# Patient Record
Sex: MALE | Race: WHITE | NOT HISPANIC OR LATINO | URBAN - METROPOLITAN AREA
[De-identification: names, ages, dates, MRNs, and addresses within clinical notes are randomized per-mention and may not be internally consistent; named-entity substitution may affect disease eponyms.]

---

## 2020-09-16 ENCOUNTER — OFFICE VISIT (OUTPATIENT)
Dept: URBAN - METROPOLITAN AREA CLINIC 121 | Facility: CLINIC | Age: 80
End: 2020-09-16

## 2020-11-12 ENCOUNTER — OFFICE VISIT (OUTPATIENT)
Dept: URBAN - METROPOLITAN AREA CLINIC 63 | Facility: CLINIC | Age: 80
End: 2020-11-12

## 2020-11-17 LAB
ABSOLUTE BASOPHILS: (no result)
ABSOLUTE EOSINOPHILS: (no result)
ABSOLUTE LYMPHOCYTES: (no result)
ABSOLUTE MONOCYTES: (no result)
ABSOLUTE NEUTROPHILS: (no result)
ALBUMIN/GLOBULIN RATIO: (no result)
ALBUMIN: (no result)
ALKALINE PHOSPHATASE: (no result)
ALT: (no result)
AST: (no result)
BASOPHILS: (no result)
BILIRUBIN, TOTAL: (no result)
BUN/CREATININE RATIO: (no result)
CALCIUM: (no result)
CARBON DIOXIDE: (no result)
CHLORIDE: (no result)
CREATININE: (no result)
EGFR AFRICAN AMERICA: (no result)
EGFR NON-AFR. AMERICAN: (no result)
EOSINOPHILS: (no result)
GLOBULIN: (no result)
GLUCOSE: (no result)
HEMATOCRIT: (no result)
HEMOGLOBIN: (no result)
INR: 1
LYMPHOCYTES: (no result)
MCH: (no result)
MCHC: (no result)
MCV: (no result)
MONOCYTES: (no result)
MPV: (no result)
NEUTROPHILS: (no result)
NOTE: (no result)
PLATELET COUNT: (no result)
POTASSIUM: (no result)
PROTEIN, TOTAL: (no result)
PT: (no result)
RDW: (no result)
RED BLOOD CELL COUNT: (no result)
SODIUM: (no result)
UREA NITROGEN (BUN): (no result)
WHITE BLOOD CELL COUNT: (no result)

## 2021-02-12 ENCOUNTER — OFFICE VISIT (OUTPATIENT)
Dept: URBAN - METROPOLITAN AREA CLINIC 63 | Facility: CLINIC | Age: 81
End: 2021-02-12

## 2021-04-15 ENCOUNTER — OFFICE VISIT (OUTPATIENT)
Dept: URBAN - METROPOLITAN AREA CLINIC 63 | Facility: CLINIC | Age: 81
End: 2021-04-15

## 2022-07-09 ENCOUNTER — TELEPHONE ENCOUNTER (OUTPATIENT)
Dept: URBAN - METROPOLITAN AREA CLINIC 121 | Facility: CLINIC | Age: 82
End: 2022-07-09

## 2022-07-09 RX ORDER — OMEPRAZOLE 20 MG/1
CAPSULE, DELAYED RELEASE ORAL ONCE A DAY
Refills: 0 | OUTPATIENT
Start: 2020-11-12 | End: 2020-11-12

## 2022-07-09 RX ORDER — FAMOTIDINE 40 MG/1
ONCE A DAY TABLET, FILM COATED ORAL ONCE A DAY
Refills: 1 | OUTPATIENT
Start: 2020-05-14 | End: 2020-05-18

## 2022-07-09 RX ORDER — OMEPRAZOLE 20 MG/1
CAPSULE, DELAYED RELEASE ORAL ONCE A DAY
Refills: 0 | OUTPATIENT
Start: 2020-01-21 | End: 2020-03-05

## 2022-07-09 RX ORDER — ATORVASTATIN CALCIUM 20 MG/1
TABLET, FILM COATED ORAL ONCE A DAY
Refills: 0 | OUTPATIENT
Start: 2020-03-05 | End: 2020-11-12

## 2022-07-09 RX ORDER — RIFAXIMIN 550 MG/1
TABLET ORAL THREE TIMES A DAY
Refills: 0 | OUTPATIENT
Start: 2020-01-21 | End: 2020-03-05

## 2022-07-09 RX ORDER — RIFAXIMIN 550 MG/1
TABLET ORAL THREE TIMES A DAY
Refills: 0 | OUTPATIENT
Start: 2020-11-12 | End: 2020-11-12

## 2022-07-09 RX ORDER — ATORVASTATIN CALCIUM 20 MG/1
TABLET, FILM COATED ORAL ONCE A DAY
Refills: 0 | OUTPATIENT
Start: 2021-02-12 | End: 2021-04-15

## 2022-07-09 RX ORDER — HYDROCHLOROTHIAZIDE 12.5 MG/1
TABLET ORAL ONCE A DAY
Refills: 0 | OUTPATIENT
Start: 2020-03-05 | End: 2020-11-12

## 2022-07-09 RX ORDER — METFORMIN HYDROCHLORIDE 500 MG/1
TABLET, EXTENDED RELEASE ORAL ONCE A DAY
Refills: 0 | OUTPATIENT
Start: 2020-03-05 | End: 2020-11-12

## 2022-07-09 RX ORDER — LOSARTAN POTASSIUM 25 MG/1
TABLET, FILM COATED ORAL ONCE A DAY
Refills: 0 | OUTPATIENT
Start: 2020-11-12 | End: 2021-02-12

## 2022-07-09 RX ORDER — AMITRIPTYLINE HYDROCHLORIDE 10 MG/1
TABLET, FILM COATED ORAL ONCE A DAY
Refills: 0 | OUTPATIENT
Start: 2020-01-21 | End: 2020-03-05

## 2022-07-09 RX ORDER — AMITRIPTYLINE HYDROCHLORIDE 10 MG/1
TABLET, FILM COATED ORAL ONCE A DAY
Refills: 0 | OUTPATIENT
Start: 2020-03-05 | End: 2020-11-12

## 2022-07-09 RX ORDER — ASPIRIN 81 MG/1
TABLET ORAL ONCE A DAY
Refills: 0 | OUTPATIENT
Start: 2021-02-12 | End: 2021-04-15

## 2022-07-09 RX ORDER — FAMOTIDINE 40 MG/1
TAKE 1 TABLET ONCE DAILY TABLET, FILM COATED ORAL
Refills: 1 | OUTPATIENT
Start: 2022-05-04 | End: 2022-05-12

## 2022-07-09 RX ORDER — METFORMIN HYDROCHLORIDE 500 MG/1
TABLET, EXTENDED RELEASE ORAL ONCE A DAY
Refills: 0 | OUTPATIENT
Start: 2020-11-12 | End: 2021-02-12

## 2022-07-09 RX ORDER — LOSARTAN POTASSIUM 25 MG/1
TABLET, FILM COATED ORAL ONCE A DAY
Refills: 0 | OUTPATIENT
Start: 2021-02-12 | End: 2021-04-15

## 2022-07-09 RX ORDER — HYDROCHLOROTHIAZIDE 12.5 MG/1
TABLET ORAL ONCE A DAY
Refills: 0 | OUTPATIENT
Start: 2020-11-12 | End: 2020-11-12

## 2022-07-09 RX ORDER — METFORMIN HYDROCHLORIDE 500 MG/1
TABLET, EXTENDED RELEASE ORAL ONCE A DAY
Refills: 0 | OUTPATIENT
Start: 2020-01-21 | End: 2020-03-05

## 2022-07-09 RX ORDER — AMOXICILLIN 500 MG/1
CAPSULE ORAL
Refills: 0 | OUTPATIENT
Start: 2020-11-12 | End: 2020-11-12

## 2022-07-09 RX ORDER — LOSARTAN POTASSIUM 25 MG/1
TABLET, FILM COATED ORAL ONCE A DAY
Refills: 0 | OUTPATIENT
Start: 2020-01-21 | End: 2020-03-05

## 2022-07-09 RX ORDER — ASPIRIN 81 MG/1
TABLET ORAL ONCE A DAY
Refills: 0 | OUTPATIENT
Start: 2020-01-21 | End: 2020-03-05

## 2022-07-09 RX ORDER — ASPIRIN 81 MG/1
TABLET ORAL ONCE A DAY
Refills: 0 | OUTPATIENT
Start: 2020-11-12 | End: 2021-02-12

## 2022-07-09 RX ORDER — AMOXICILLIN 500 MG/1
CAPSULE ORAL
Refills: 0 | OUTPATIENT
Start: 2020-03-05 | End: 2020-11-12

## 2022-07-09 RX ORDER — ASPIRIN 81 MG/1
TABLET ORAL ONCE A DAY
Refills: 0 | OUTPATIENT
Start: 2020-03-05 | End: 2020-11-12

## 2022-07-09 RX ORDER — AMOXICILLIN 500 MG/1
CAPSULE ORAL
Refills: 0 | OUTPATIENT
Start: 2020-01-21 | End: 2020-03-05

## 2022-07-09 RX ORDER — FAMOTIDINE 40 MG/1
TAKE 1 TABLET ONCE DAILY TABLET, FILM COATED ORAL
Refills: 1 | OUTPATIENT
Start: 2021-03-19 | End: 2021-10-04

## 2022-07-09 RX ORDER — FAMOTIDINE 40 MG/1
TAKE 1 TABLET ONCE DAILY TABLET, FILM COATED ORAL ONCE A DAY
Refills: 1 | OUTPATIENT
Start: 2021-10-04 | End: 2022-05-04

## 2022-07-09 RX ORDER — ATORVASTATIN CALCIUM 20 MG/1
TABLET, FILM COATED ORAL ONCE A DAY
Refills: 0 | OUTPATIENT
Start: 2020-01-21 | End: 2020-03-05

## 2022-07-09 RX ORDER — HYDROCHLOROTHIAZIDE 12.5 MG/1
TABLET ORAL ONCE A DAY
Refills: 0 | OUTPATIENT
Start: 2020-01-21 | End: 2020-03-05

## 2022-07-09 RX ORDER — ATORVASTATIN CALCIUM 20 MG/1
TABLET, FILM COATED ORAL ONCE A DAY
Refills: 0 | OUTPATIENT
Start: 2020-11-12 | End: 2021-02-12

## 2022-07-09 RX ORDER — LOSARTAN POTASSIUM 25 MG/1
TABLET, FILM COATED ORAL ONCE A DAY
Refills: 0 | OUTPATIENT
Start: 2020-03-05 | End: 2020-11-12

## 2022-07-09 RX ORDER — AMITRIPTYLINE HYDROCHLORIDE 10 MG/1
TABLET, FILM COATED ORAL ONCE A DAY
Refills: 0 | OUTPATIENT
Start: 2020-11-12 | End: 2020-11-12

## 2022-07-09 RX ORDER — FAMOTIDINE 40 MG/1
ONCE A DAY TABLET, FILM COATED ORAL ONCE A DAY
Refills: 1 | OUTPATIENT
Start: 2020-11-12 | End: 2021-03-19

## 2022-07-09 RX ORDER — METFORMIN HYDROCHLORIDE 500 MG/1
TABLET, EXTENDED RELEASE ORAL ONCE A DAY
Refills: 0 | OUTPATIENT
Start: 2021-02-12 | End: 2021-04-15

## 2022-07-09 RX ORDER — OMEPRAZOLE 20 MG/1
CAPSULE, DELAYED RELEASE ORAL ONCE A DAY
Refills: 0 | OUTPATIENT
Start: 2020-03-05 | End: 2020-11-12

## 2022-07-09 RX ORDER — FAMOTIDINE 40 MG/1
ONCE A DAY TABLET, FILM COATED ORAL ONCE A DAY
Refills: 1 | OUTPATIENT
Start: 2020-04-16 | End: 2020-05-14

## 2022-07-09 RX ORDER — FAMOTIDINE 40 MG/1
ONCE A DAY TABLET, FILM COATED ORAL ONCE A DAY
Refills: 1 | OUTPATIENT
Start: 2020-05-18 | End: 2020-11-12

## 2022-07-09 RX ORDER — RIFAXIMIN 550 MG/1
TABLET ORAL THREE TIMES A DAY
Refills: 0 | OUTPATIENT
Start: 2020-03-05 | End: 2020-11-12

## 2022-07-10 ENCOUNTER — TELEPHONE ENCOUNTER (OUTPATIENT)
Dept: URBAN - METROPOLITAN AREA CLINIC 121 | Facility: CLINIC | Age: 82
End: 2022-07-10

## 2022-07-10 RX ORDER — METFORMIN HYDROCHLORIDE 500 MG/1
TABLET, EXTENDED RELEASE ORAL ONCE A DAY
Refills: 0 | Status: ACTIVE | COMMUNITY
Start: 2021-04-15

## 2022-07-10 RX ORDER — ATORVASTATIN CALCIUM 20 MG/1
TABLET, FILM COATED ORAL ONCE A DAY
Refills: 0 | Status: ACTIVE | COMMUNITY
Start: 2021-04-15

## 2022-07-10 RX ORDER — LOSARTAN POTASSIUM 25 MG/1
TABLET, FILM COATED ORAL ONCE A DAY
Refills: 0 | Status: ACTIVE | COMMUNITY
Start: 2021-04-15

## 2022-07-10 RX ORDER — FAMOTIDINE 40 MG/1
ONCE A DAY TABLET, FILM COATED ORAL ONCE A DAY
Refills: 4 | Status: ACTIVE | COMMUNITY
Start: 2020-01-21

## 2022-07-10 RX ORDER — FAMOTIDINE 40 MG/1
TAKE 1 TABLET ONCE DAILY TABLET, FILM COATED ORAL
Refills: 0 | Status: ACTIVE | COMMUNITY
Start: 2022-05-12

## 2022-07-10 RX ORDER — ASPIRIN 81 MG/1
TABLET ORAL ONCE A DAY
Refills: 0 | Status: ACTIVE | COMMUNITY
Start: 2021-04-15

## 2022-11-04 ENCOUNTER — TELEPHONE ENCOUNTER (OUTPATIENT)
Dept: URBAN - METROPOLITAN AREA CLINIC 63 | Facility: CLINIC | Age: 82
End: 2022-11-04

## 2022-11-04 RX ORDER — FAMOTIDINE 40 MG/1
TAKE 1 TABLET ONCE DAILY TABLET, FILM COATED ORAL ONCE A DAY
Qty: 90 TABLET | Refills: 1

## 2022-11-11 ENCOUNTER — DASHBOARD ENCOUNTERS (OUTPATIENT)
Age: 82
End: 2022-11-11

## 2022-11-11 ENCOUNTER — OFFICE VISIT (OUTPATIENT)
Dept: URBAN - METROPOLITAN AREA CLINIC 63 | Facility: CLINIC | Age: 82
End: 2022-11-11
Payer: MEDICARE

## 2022-11-11 VITALS
HEIGHT: 70 IN | WEIGHT: 149 LBS | TEMPERATURE: 97.4 F | SYSTOLIC BLOOD PRESSURE: 120 MMHG | HEART RATE: 59 BPM | DIASTOLIC BLOOD PRESSURE: 80 MMHG | OXYGEN SATURATION: 96 % | BODY MASS INDEX: 21.33 KG/M2

## 2022-11-11 DIAGNOSIS — K22.70 BARRETTS ESOPHAGUS: ICD-10-CM

## 2022-11-11 DIAGNOSIS — R63.4 WEIGHT LOSS: ICD-10-CM

## 2022-11-11 DIAGNOSIS — C61 PROSTATE CANCER: ICD-10-CM

## 2022-11-11 DIAGNOSIS — Z86.010 PERSONAL HISTORY OF COLON POLYPS: ICD-10-CM

## 2022-11-11 PROBLEM — 254900004 PROSTATE CANCER: Status: ACTIVE | Noted: 2022-11-11

## 2022-11-11 PROBLEM — 302914006 BARRETTS ESOPHAGUS: Status: ACTIVE | Noted: 2022-11-10

## 2022-11-11 PROBLEM — 428283002 HISTORY OF POLYP OF COLON: Status: ACTIVE | Noted: 2022-11-10

## 2022-11-11 PROCEDURE — 99214 OFFICE O/P EST MOD 30 MIN: CPT | Performed by: INTERNAL MEDICINE

## 2022-11-11 RX ORDER — LOSARTAN POTASSIUM 25 MG/1
TABLET, FILM COATED ORAL ONCE A DAY
Refills: 0 | Status: ACTIVE | COMMUNITY
Start: 2021-04-15

## 2022-11-11 RX ORDER — DENOSUMAB 120 MG/1.7ML
AS DIRECTED INJECTION SUBCUTANEOUS
Status: ACTIVE | COMMUNITY

## 2022-11-11 RX ORDER — ASPIRIN 81 MG/1
TABLET ORAL ONCE A DAY
Refills: 0 | Status: ACTIVE | COMMUNITY
Start: 2021-04-15

## 2022-11-11 RX ORDER — ATORVASTATIN CALCIUM 20 MG/1
TABLET, FILM COATED ORAL ONCE A DAY
Refills: 0 | Status: ACTIVE | COMMUNITY
Start: 2021-04-15

## 2022-11-11 RX ORDER — FAMOTIDINE 40 MG/1
TAKE 1 TABLET ONCE DAILY TABLET, FILM COATED ORAL ONCE A DAY
Qty: 90 TABLET | Refills: 1 | Status: ACTIVE | COMMUNITY

## 2022-11-11 RX ORDER — METFORMIN HYDROCHLORIDE 500 MG/1
TABLET, EXTENDED RELEASE ORAL ONCE A DAY
Refills: 0 | Status: ACTIVE | COMMUNITY
Start: 2021-04-15

## 2022-11-11 NOTE — HPI-PREVIOUS PROCEDURES
Upper endoscopy February 2020: LA grade A esophagitis with focus of intestinal metaplasia, 2 cm hiatal hernia, nonobstructing Schatzki's ring, mild H. pylori negative gastritis, normal duodenum with unremarkable biopsies [place]: Small diminutive polyps-adenomas in the transverse colon, descending colon and sigmoid colon, diverticulosis-advise recall in 3 years.

## 2022-11-11 NOTE — HPI-PREVIOUS IMAGING
CT enterography February 2021: Small cyst in the liver, diffuse fatty infiltration, normal gallbladder, normal pancreas right renal stone, nonobstructing, colonic diverticulosis, urinary bladder wall thickening, prostatomegaly-6 cm prostate CT scan abdomen pelvis with contrast January 2021: Nonenhanced exam noted extensive mesenteric edema in the central and left abdomen with prominent fluid-filled small bowel loops without any significant dilatation.  Normal colon, diverticulosis, enlarged prostate, normal liver spleen pancreas. Upper GI series small bowel follow-through January 2021: Normal exam, resolution of small bowel and no etiology found. 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

## 2022-11-11 NOTE — HPI-TODAY'S VISIT:
Luis is here today in follow-up. He is doing well from a GI standpoint.  Denies any complaints.  Apparently last June he was diagnosed with metastatic prostate cancer.  This information I got from talking to his wife over the phone as Luis was unable to recall what cancer he had.  He appeared mildly confused. Since his diagnosis apparently he lost 25 pounds.  He is starting to gain his weight back. He denies any symptoms of acid reflux.  He has a diagnosis of ultrashort segment Jesus's esophagus.  He is on famotidine.  Reports no dysphagia early satiety or bloating.  Denies any constipation diarrhea rectal bleeding or melena or any loss of appetite.  His colonoscopy did not show any significant findings except for small polyps-refer below He declines any interventions..  Earlier in 2021 he was hospitalized with acute onset abdominal pain nausea vomiting and CT showing extensive mesenteric edema with fluid-filled small bowel loops.  He responded to conservative management.  Upper GI small bowel follow-through was unremarkable and he was discharged. There is also a history of prostate intervention that led to worsening diarrhea in 2021.  He was also on metformin. Colonoscopy 2019 noted a good prep and subcentimeter polyps in the transverse colon, descending colon and sigmoid colon adenomas. Previous GI work-up for chronic diarrhea noted negative celiac serologies, normal pancreatic elastase, improved with on a trial of Xifaxan. Upper endoscopy February 2020 noted LA grade A esophagitis with focus of intestinal metaplasia, 2 cm hiatal hernia, nonobstructive Schatzki's ring, H. pylori negative gastritis, normal duodenum with unremarkable biopsies.

## 2022-11-11 NOTE — HPI-PREVIOUS LABS
Labs January 2021: WBC 8.1, hemoglobin 15.4, hemoglobin A1c 5.7, normal liver enzymes 2019: C[place]ac [place]ologies [place] negative, pancreatic elastase 318,

## 2022-11-22 ENCOUNTER — ERX REFILL RESPONSE (OUTPATIENT)
Dept: URBAN - METROPOLITAN AREA CLINIC 63 | Facility: CLINIC | Age: 82
End: 2022-11-22

## 2022-11-22 RX ORDER — FAMOTIDINE 40 MG/1
TAKE 1 TABLET ONCE DAILY TABLET, FILM COATED ORAL ONCE A DAY
Qty: 90 TABLET | Refills: 1 | OUTPATIENT

## 2022-11-22 RX ORDER — FAMOTIDINE 40 MG/1
TAKE 1 TABLET ONCE DAILY TABLET, FILM COATED ORAL
Qty: 90 TABLET | Refills: 0 | OUTPATIENT

## 2023-04-24 ENCOUNTER — ERX REFILL RESPONSE (OUTPATIENT)
Dept: URBAN - METROPOLITAN AREA CLINIC 63 | Facility: CLINIC | Age: 83
End: 2023-04-24

## 2023-04-24 RX ORDER — FAMOTIDINE 40 MG/1
TAKE 1 TABLET ONCE DAILY TABLET, FILM COATED ORAL
Qty: 90 TABLET | Refills: 1 | OUTPATIENT

## 2023-04-24 RX ORDER — FAMOTIDINE 40 MG/1
TAKE 1 TABLET ONCE DAILY TABLET, FILM COATED ORAL
Qty: 90 TABLET | Refills: 0 | OUTPATIENT

## 2023-10-25 ENCOUNTER — ERX REFILL RESPONSE (OUTPATIENT)
Dept: URBAN - METROPOLITAN AREA CLINIC 63 | Facility: CLINIC | Age: 83
End: 2023-10-25

## 2023-10-25 RX ORDER — FAMOTIDINE 40 MG/1
TAKE 1 TABLET ONCE DAILY TABLET, FILM COATED ORAL
Qty: 90 TABLET | Refills: 1 | OUTPATIENT

## 2024-09-29 ENCOUNTER — ERX REFILL RESPONSE (OUTPATIENT)
Dept: URBAN - METROPOLITAN AREA CLINIC 63 | Facility: CLINIC | Age: 84
End: 2024-09-29

## 2024-09-29 RX ORDER — FAMOTIDINE 40 MG/1
TAKE 1 TABLET ONCE DAILY TABLET, FILM COATED ORAL
Qty: 90 TABLET | Refills: 1 | OUTPATIENT